# Patient Record
(demographics unavailable — no encounter records)

---

## 2025-05-15 NOTE — HISTORY OF PRESENT ILLNESS
[de-identified] : Pt. with h/o chronic bilateral OM, chronic rhinitis, snoring, and s/p b/l tympanoplasty with insertion of tubes and a frenectomy and frenulectomy on 01/12/23 presents today with Mom for a follow up. Mom states she been good and all is well. States she has been allowing the ears to get wet.

## 2025-05-15 NOTE — CONSULT LETTER
[Dear  ___] : Dear  [unfilled], [Courtesy Letter:] : I had the pleasure of seeing your patient, [unfilled], in my office today. [Please see my note below.] : Please see my note below. [Consult Closing:] : Thank you very much for allowing me to participate in the care of this patient.  If you have any questions, please do not hesitate to contact me. [Sincerely,] : Sincerely, [FreeTextEntry3] : Jonah Siddiqui MD FACS

## 2025-05-15 NOTE — ADDENDUM
[FreeTextEntry1] : Documented by Joanna Mobley acting as scribe for Dr. Siddiqui on 05/15/2025 All Medical record entries made by the Scribe were at my, Dr. Siddiqui, direction and personally dictated by me on 05/15/2025  . I have reviewed the chart and agree that the record accurately reflects my personal performance of the history, physical exam, assessment and plan. I have also personally directed, reviewed, and agreed with the discharge instructions.

## 2025-05-15 NOTE — PHYSICAL EXAM
[2+] : 2+ [Normal] : normal [FreeTextEntry8] : Tube looks like it's starting to come out [FreeTextEntry9] : tube looks like it's starting to come out [de-identified] : mildly inflamed turbinates [de-identified] : mildly inflamed turbinates

## 2025-05-15 NOTE — ASSESSMENT
[FreeTextEntry1] : Reviewed and reconciled medications, allergies, PMHx, PSHx, SocHx, FMHx   Pt. with h/o chronic bilateral OM, chronic rhinitis, snoring, and s/p b/l tympanoplasty with insertion of tubes and a frenectomy and frenulectomy on 01/12/23 presents today with Mom for a follow up. Mom states she been good and all is well. States she has been allowing the ears to get wet.  Physical Exam: -right ear: Tube looks like it's starting to come out -left ear: Tube looks like it's starting to come out -mildly inflamed turbinates -tonsils class 2  Audio: -hearing WNL  -SRT 5 bilaterally -right TPP -5 -left TPP 0  Plan: Audio - results interpreted by Dr. Siddiqui and reviewed with the patient. Let ears get wet. FU in September